# Patient Record
Sex: MALE | Race: OTHER | URBAN - METROPOLITAN AREA
[De-identification: names, ages, dates, MRNs, and addresses within clinical notes are randomized per-mention and may not be internally consistent; named-entity substitution may affect disease eponyms.]

---

## 2021-04-09 ENCOUNTER — OFFICE VISIT (OUTPATIENT)
Dept: ORTHOPEDIC SURGERY | Age: 51
End: 2021-04-09
Payer: COMMERCIAL

## 2021-04-09 VITALS — HEIGHT: 68 IN | RESPIRATION RATE: 16 BRPM | WEIGHT: 180 LBS | BODY MASS INDEX: 27.28 KG/M2

## 2021-04-09 DIAGNOSIS — S42.254A NONDISPLACED FRACTURE OF GREATER TUBEROSITY OF RIGHT HUMERUS, INITIAL ENCOUNTER FOR CLOSED FRACTURE: Primary | ICD-10-CM

## 2021-04-09 PROCEDURE — 99203 OFFICE O/P NEW LOW 30 MIN: CPT | Performed by: ORTHOPAEDIC SURGERY

## 2021-04-09 PROCEDURE — L3660 SO 8 AB RSTR CAN/WEB PRE OTS: HCPCS | Performed by: ORTHOPAEDIC SURGERY

## 2021-04-09 RX ORDER — PREDNISONE 20 MG/1
TABLET ORAL
COMMUNITY
Start: 2021-04-03

## 2021-04-09 RX ORDER — CYCLOBENZAPRINE HCL 5 MG
TABLET ORAL
COMMUNITY
Start: 2021-04-03

## 2021-04-09 SDOH — HEALTH STABILITY: MENTAL HEALTH: HOW OFTEN DO YOU HAVE A DRINK CONTAINING ALCOHOL?: NOT ASKED

## 2021-04-09 NOTE — PROGRESS NOTES
ORTHOPAEDIC CONSULTATION NOTE    Chief Complaint   Patient presents with    Shoulder Pain     shoulder       HPI   4/9/2021  49 y.o. male RHD seen in consultation at the request of Haylie GOMEZ for evaluation of right shoulder pain:    Onset 4/2/2021  Injury/trauma - playing soccer, and fell onto the right shoulder   Felt a crack  History of symptoms - denies  Pain is located right lateral shoulder  Worse with attempted shoulder elevation  Better with rest  Associated with swelling and bruising, weakness    Does not smoke      Review of Systems - reviewed and none listed  Constitutional  denies fevers, weight loss  Cardiovascular  denies chest pain, palpitations, peripheral edema, blood clots  Respiratory  denies SOB, cough  Gastrointestinal  denies abdominal pain, nausea, vomiting  Genitourinary  denies dysuria, discharge  Musculoskeletal  per HPI  Integumentary  denies rash, sores  Neurologic  denies numbness, tingling, paresthesias  Hematologic  denies abnormal bleeding, blood clots  Allergic/Immunologic  denies metal allergies, recurrent infections    No Known Allergies     Current Outpatient Medications   Medication Sig Dispense Refill    cyclobenzaprine (FLEXERIL) 5 MG tablet TAKE 1 TABLET BY MOUTH THREE TIMES DAILY AS NEEDED FOR MUSCLE PAIN OR TIGHTNESS      predniSONE (DELTASONE) 20 MG tablet TAKE 1 TABLET BY MOUTH TWICE DAILY FOR 5 DAYS THEN TAKE 1 TABLET BY MOUTH DAILY FOR 2 DAYS       No current facility-administered medications for this visit. No past medical history on file. No past surgical history on file. No family history on file.       Social History     Socioeconomic History    Marital status: Single     Spouse name: Not on file    Number of children: Not on file    Years of education: Not on file    Highest education level: Not on file   Occupational History    Not on file   Social Needs    Financial resource strain: Not on file    Food insecurity     Worry: Not on file     Inability: Not on file    Transportation needs     Medical: Not on file     Non-medical: Not on file   Tobacco Use    Smoking status: Never Smoker    Smokeless tobacco: Never Used   Substance and Sexual Activity    Alcohol use: Yes    Drug use: Never    Sexual activity: Not on file   Lifestyle    Physical activity     Days per week: Not on file     Minutes per session: Not on file    Stress: Not on file   Relationships    Social connections     Talks on phone: Not on file     Gets together: Not on file     Attends Oriental orthodox service: Not on file     Active member of club or organization: Not on file     Attends meetings of clubs or organizations: Not on file     Relationship status: Not on file    Intimate partner violence     Fear of current or ex partner: Not on file     Emotionally abused: Not on file     Physically abused: Not on file     Forced sexual activity: Not on file   Other Topics Concern    Not on file   Social History Narrative    Not on file        Vitals:    04/09/21 0818   Resp: 16   Weight: 180 lb (81.6 kg)   Height: 5' 8\" (1.727 m)       Physical Exam  Constitutional  well-groomed, well-nourished, Body mass index is 27.37 kg/m². Psychiatric  pleasant, normal mood & affect  Cardiovascular  RRR, right radial pulse intact  Respiratory  respirations unlabored, on room air  Skin  no rashes, wounds, or lesions seen on exposed skin  Neck/Spine - full ROM, no TTP of spinous processes, no TTP of paraspinal musculature,  negative Spurling's  Neurological - SILT M/U/R/A nerve distributions; AIN/PIN/IO intact  Right shoulder - TTP proximal humerus   No TTP scapula, clavicle   No TTP distally   Shoulder ROM not formally tested   Swelling and ecchymosis right anterior shoulder/arm      Imaging:  Images were personally reviewed by myself and discussed with the patient  Right shoulder 4 views performed today in clinic   -  Nondisplaced fracture of the greater tuberosity is seen. No definite extension into the surgical neck is visualized. Glenohumeral articulation is within normal limits, there are no loose bodies appreciated. Rafat's line is preserved. On axillary view, the humeral head is well-centered within the glenoid. The acromioclavicular joint demonstrates no significant degenerative changes. Assessment & Plan:  49 y.o. male who presents with    Diagnosis Orders   1. Nondisplaced fracture of greater tuberosity of right humerus, initial encounter for closed fracture  XR SHOULDER RIGHT (MIN 2 VIEWS)    Breg DLX Shoulder Immobilizer    MRI SHOULDER RIGHT WO CONTRAST           Procedures    Breg DLX Shoulder Immobilizer     Patient was prescribed a DLX Shoulder Immobilizer. The right shoulder will require stabilization / immobilization from this orthosis. The orthosis will assist in protecting the affected area, provide functional support and facilitate healing. The patient was educated and fit by a healthcare professional with expert knowledge and specialization in brace application while under the direct supervision of the treating physician. Verbal and written instructions for the use of and application of this item were provided. They were instructed to contact the office immediately should the brace result in increased pain, decreased sensation, increased swelling or worsening of the condition. Reviewed injury/fracture and imaging with patient  Recommended conservative treatment    Sling, RUE NWB  Ice, OTC tylenol PRN  No shoulder ROM    BID loosen sling and perform elbow, forearm, wrist, finger ROM exercises    Obtain advanced imaging to evaluate for concomitant pathology and confirm fracture    I will order an MRI to evaluate. The patient is advised to return once completed so that we may review the images together and to discuss treatment options.       Dc Gonsales

## 2021-04-20 ENCOUNTER — OFFICE VISIT (OUTPATIENT)
Dept: ORTHOPEDIC SURGERY | Age: 51
End: 2021-04-20
Payer: COMMERCIAL

## 2021-04-20 DIAGNOSIS — S42.254D NONDISPLACED FRACTURE OF GREATER TUBEROSITY OF RIGHT HUMERUS, SUBSEQUENT ENCOUNTER FOR FRACTURE WITH ROUTINE HEALING: Primary | ICD-10-CM

## 2021-04-20 PROCEDURE — 99213 OFFICE O/P EST LOW 20 MIN: CPT | Performed by: ORTHOPAEDIC SURGERY

## 2021-04-20 NOTE — PROGRESS NOTES
ORTHOPAEDIC CONSULTATION NOTE    Chief Complaint   Patient presents with    Results     mri results        HPI   4/20/2021  FU right shoulder  Pain rated 6/10 today  He reports it is feeling better  Bruising improved  He reports he is using the sling, but he did not bring to office today  Denies N/T      4/9/2021  49 y.o. male RHD seen in consultation at the request of Shannan GOMEZ for evaluation of right shoulder pain:    Onset 4/2/2021  Injury/trauma - playing soccer, and fell onto the right shoulder   Felt a crack  History of symptoms - denies  Pain is located right lateral shoulder  Worse with attempted shoulder elevation  Better with rest  Associated with swelling and bruising, weakness    Does not smoke        No Known Allergies     Current Outpatient Medications   Medication Sig Dispense Refill    cyclobenzaprine (FLEXERIL) 5 MG tablet TAKE 1 TABLET BY MOUTH THREE TIMES DAILY AS NEEDED FOR MUSCLE PAIN OR TIGHTNESS      predniSONE (DELTASONE) 20 MG tablet TAKE 1 TABLET BY MOUTH TWICE DAILY FOR 5 DAYS THEN TAKE 1 TABLET BY MOUTH DAILY FOR 2 DAYS       No current facility-administered medications for this visit. No past medical history on file. No past surgical history on file. No family history on file. Social History     Socioeconomic History    Marital status: Single     Spouse name: Not on file    Number of children: Not on file    Years of education: Not on file    Highest education level: Not on file   Occupational History    Not on file   Social Needs    Financial resource strain: Not on file    Food insecurity     Worry: Not on file     Inability: Not on file    Transportation needs     Medical: Not on file     Non-medical: Not on file   Tobacco Use    Smoking status: Never Smoker    Smokeless tobacco: Never Used   Substance and Sexual Activity    Alcohol use:  Yes    Drug use: Never    Sexual activity: Not on file   Lifestyle    Physical activity     Days per week: Not on file     Minutes per session: Not on file    Stress: Not on file   Relationships    Social connections     Talks on phone: Not on file     Gets together: Not on file     Attends Zoroastrian service: Not on file     Active member of club or organization: Not on file     Attends meetings of clubs or organizations: Not on file     Relationship status: Not on file    Intimate partner violence     Fear of current or ex partner: Not on file     Emotionally abused: Not on file     Physically abused: Not on file     Forced sexual activity: Not on file   Other Topics Concern    Not on file   Social History Narrative    Not on file        There were no vitals filed for this visit. Physical Exam  There is no height or weight on file to calculate BMI. Neurological - RUE SILT M/U/R/A nerve distributions; AIN/PIN/IO intact  Right shoulder - TTP proximal humerus   No TTP scapula, clavicle   No TTP distally   PROM    FE/scaption 120, did not test above that   Swelling and ecchymosis right anterior shoulder/arm present, but improved compared to last visit      Imaging:  Images were personally reviewed by myself and discussed with the patient   Narrative   Site: [facility] #: [id number] #: [id number] Procedure: MR Right Shoulder joint w/o Contrast ; Reason for Exam: nondisplaced fracture of greater tuberosity of right humerus initial encounter for closed fracture   This document is confidential medical information.  Unauthorized disclosure or use of this information is prohibited by law. If you are not the intended recipient of this document, please advise us by calling immediately [phone].       [facility], [address] [place] [address] Road           Patient Name: Juan Jose Lynne   Case ID: [id number]   Patient : 1970   Referring Physician: Julianne Morgan MD   Exam Date: 2021   Exam Description: MR Right Shoulder joint w/o Contrast            HISTORY:  49year-old male with right shoulder pain and limited range of motion after injury on    04/02/2021; patient was playing soccer and fell with the arm reached out.  No history of    surgery.  Evaluate for greater tuberosity fracture.       TECHNICAL FACTORS:  Long- and short-axis fat- and water-weighted images were performed.       COMPARISON:  None.       FINDINGS:  ROTATOR CUFF:   Supraspinatus/infraspinatus: Swelling and contusion of the supraspinatus and infraspinatus    tendons, secondary to adjacent greater tuberosity fracture.  No full-thickness or retracted    tear.       Subscapularis: Intact.       Teres Minor: Intact.       Biceps Tendon: Intact.       ACROMIOCLAVICULAR JOINT:   Articular Surfaces: No bony hypertrophy or degenerative changes of the AC joint.  No evidence of    AC joint separation or AC ligament injury.       Coracoclavicular Ligaments: Intact.       Subacromial/Subdeltoid Bursa: Unremarkable.       Subacromial Arch/Outlet: Type 2 acromion, with mild posterior downsloping, contributing to    lateral outlet stenosis, without evidence of active impingement.       GLENOHUMERAL JOINT:   Articular Surfaces: No high-grade chondromalacia of the glenohumeral articular surfaces.       Glenoid Labrum: Small, chronic SLAP 1-2 tear of the superior glenoid labrum, without displaced    fragment or paralabral pseudocyst formation.           GENERAL:   Bones: Comminuted fracture of the greater tuberosity, with slight cortical depression and    fragmentation at the anterolateral margin, but no grossly displaced fracture fragment.  No    evidence of glenohumeral dislocation injury.  The humeral head is centered within the glenoid.       Muscles: Mild swelling of the deep anterior deltoid muscles, overlying the lateral margin of    the greater tuberosity.  The remaining shoulder musculature is intact.       Effusion: None.       Intra-Articular Bodies: None.       Soft Tissue: Unremarkable.       Axilla/Visualized Lung Madden: Unremarkable.       CONCLUSION:   1. Comminuted fracture of the greater tuberosity, with slight cortical depression and    fragmentation at the anterolateral margin, but no grossly displaced fracture fragment. 2. Adjacent swelling and contusion of the distal supraspinatus and infraspinatus tendon; no    full-thickness or retracted rotator cuff tear. 3. Mild swelling of the deep anterior deltoid muscles, overlying the lateral margin of the    greater tuberosity. 4. Small, chronic nondisplaced SLAP 1-2 tear of the superior glenoid labrum.       Thank you for the opportunity to provide your interpretation.       Abby Castellanos. Glen Otero MD       A: FRANNY/SARAH 04/14/2021 10:49 AM         Right shoulder 2 views repeated today in clinic for serial followup imaging -  Nondisplaced fracture of the greater tuberosity again seen. No extension into the surgical neck is visualized. Glenohumeral articulation is within normal limits, there are no loose bodies appreciated. Rafat's line is preserved. On axillary view, the humeral head is well-centered within the glenoid. Assessment & Plan:  49 y.o. male who presents with    Diagnosis Orders   1. Nondisplaced fracture of greater tuberosity of right humerus, subsequent encounter for fracture with routine healing  XR SHOULDER RIGHT (MIN 2 VIEWS)       No orders of the defined types were placed in this encounter. Reviewed injury/fracture and imaging (including MRI) with patient  Recommend continued nonoperative treatment    Continue sling  RUE NWB    BID home exercise program (10 reps each time):   1) passive ER with stick    2) passive FE using pulley or opposite hand to just above shoulder level   3) continue with elbow, forearm, wrist, finger ROM     HEP printed for patient  Continue with ice and tylenol. Okay to resume NSAIDs as well as needed.     FU in 1 month with repeat XRs, will likely progress activities then    Anders Napoles

## 2021-06-02 ENCOUNTER — OFFICE VISIT (OUTPATIENT)
Dept: ORTHOPEDIC SURGERY | Age: 51
End: 2021-06-02
Payer: COMMERCIAL

## 2021-06-02 VITALS — WEIGHT: 180 LBS | TEMPERATURE: 98.2 F | HEIGHT: 68 IN | BODY MASS INDEX: 27.28 KG/M2

## 2021-06-02 DIAGNOSIS — S42.254D NONDISPLACED FRACTURE OF GREATER TUBEROSITY OF RIGHT HUMERUS, SUBSEQUENT ENCOUNTER FOR FRACTURE WITH ROUTINE HEALING: Primary | ICD-10-CM

## 2021-06-02 PROCEDURE — 99213 OFFICE O/P EST LOW 20 MIN: CPT | Performed by: ORTHOPAEDIC SURGERY

## 2021-06-02 NOTE — PROGRESS NOTES
document, please advise us by calling immediately [phone].       [facility], [address] [place] [address]           Patient Name: Caitlin Chin   Case ID: [id number]   Patient : 1970   Referring Physician: Deanna Ordoñez MD   Exam Date: 2021   Exam Description: MR Right Shoulder joint w/o Contrast            HISTORY:  49year-old male with right shoulder pain and limited range of motion after injury on    2021; patient was playing soccer and fell with the arm reached out.  No history of    surgery.  Evaluate for greater tuberosity fracture.       TECHNICAL FACTORS:  Long- and short-axis fat- and water-weighted images were performed.       COMPARISON:  None.       FINDINGS:  ROTATOR CUFF:   Supraspinatus/infraspinatus: Swelling and contusion of the supraspinatus and infraspinatus    tendons, secondary to adjacent greater tuberosity fracture.  No full-thickness or retracted    tear.       Subscapularis: Intact.       Teres Minor: Intact.       Biceps Tendon: Intact.       ACROMIOCLAVICULAR JOINT:   Articular Surfaces: No bony hypertrophy or degenerative changes of the AC joint.  No evidence of    AC joint separation or AC ligament injury.       Coracoclavicular Ligaments: Intact.       Subacromial/Subdeltoid Bursa: Unremarkable.       Subacromial Arch/Outlet: Type 2 acromion, with mild posterior downsloping, contributing to    lateral outlet stenosis, without evidence of active impingement.       GLENOHUMERAL JOINT:   Articular Surfaces: No high-grade chondromalacia of the glenohumeral articular surfaces.       Glenoid Labrum: Small, chronic SLAP 1-2 tear of the superior glenoid labrum, without displaced    fragment or paralabral pseudocyst formation.           GENERAL:   Bones: Comminuted fracture of the greater tuberosity, with slight cortical depression and    fragmentation at the anterolateral margin, but no grossly displaced fracture fragment.  No    evidence of glenohumeral dislocation injury.  The humeral head is centered within the glenoid.       Muscles: Mild swelling of the deep anterior deltoid muscles, overlying the lateral margin of    the greater tuberosity.  The remaining shoulder musculature is intact.       Effusion: None.       Intra-Articular Bodies: None.       Soft Tissue: Unremarkable.       Axilla/Visualized Lung Fields: Unremarkable.       CONCLUSION:   1. Comminuted fracture of the greater tuberosity, with slight cortical depression and    fragmentation at the anterolateral margin, but no grossly displaced fracture fragment. 2. Adjacent swelling and contusion of the distal supraspinatus and infraspinatus tendon; no    full-thickness or retracted rotator cuff tear. 3. Mild swelling of the deep anterior deltoid muscles, overlying the lateral margin of the    greater tuberosity. 4. Small, chronic nondisplaced SLAP 1-2 tear of the superior glenoid labrum.       Thank you for the opportunity to provide your interpretation.       Mildred Pleitez. Marc Escamilla MD       A: FRANNY/SARAH 04/14/2021 10:49 AM         Right shoulder 4 views repeated today in clinic for serial followup imaging - healing impacted fracture of the greater tuberosity again seen. No extension into the surgical neck is visualized. No interval displacement. Glenohumeral articulation is within normal limits, there are no loose bodies appreciated. Rafat's line is preserved. On axillary view, the humeral head is well-centered within the glenoid. Assessment & Plan:  49 y.o. male who presents with    Diagnosis Orders   1. Nondisplaced fracture of greater tuberosity of right humerus, subsequent encounter for fracture with routine healing  XR SHOULDER RIGHT (MIN 2 VIEWS)       No orders of the defined types were placed in this encounter.     He is doing well  He has good ROM and strength on exam  He declined formal PT for exercises    Advance activities as tolerated gradually  OTC tylenol/NSAID PRN  Back off if pain returns    FU with any issues    Maciej Gross MD

## 2023-10-10 ENCOUNTER — HOSPITAL ENCOUNTER (OUTPATIENT)
Dept: ULTRASOUND IMAGING | Age: 53
Discharge: HOME | End: 2023-10-10
Payer: COMMERCIAL

## 2023-10-10 DIAGNOSIS — N28.9 DISORDER OF KIDNEY AND URETER: ICD-10-CM

## 2023-10-10 PROCEDURE — 76775 US EXAM ABDO BACK WALL LIM: CPT

## 2024-04-29 ENCOUNTER — HOSPITAL ENCOUNTER (EMERGENCY)
Age: 54
Discharge: HOME | End: 2024-04-29
Payer: COMMERCIAL

## 2024-04-29 ENCOUNTER — APPOINTMENT (OUTPATIENT)
Dept: GENERAL RADIOLOGY | Age: 54
End: 2024-04-29
Payer: COMMERCIAL

## 2024-04-29 VITALS
BODY MASS INDEX: 27.74 KG/M2 | WEIGHT: 183 LBS | HEIGHT: 68 IN | SYSTOLIC BLOOD PRESSURE: 179 MMHG | RESPIRATION RATE: 16 BRPM | TEMPERATURE: 97.6 F | DIASTOLIC BLOOD PRESSURE: 110 MMHG | HEART RATE: 86 BPM | OXYGEN SATURATION: 98 %

## 2024-04-29 DIAGNOSIS — I10 ESSENTIAL HYPERTENSION: ICD-10-CM

## 2024-04-29 DIAGNOSIS — S60.031A CONTUSION OF RIGHT MIDDLE FINGER WITHOUT DAMAGE TO NAIL, INITIAL ENCOUNTER: ICD-10-CM

## 2024-04-29 DIAGNOSIS — S60.10XA SUBUNGUAL HEMATOMA OF DIGIT OF HAND, INITIAL ENCOUNTER: Primary | ICD-10-CM

## 2024-04-29 PROCEDURE — 6370000000 HC RX 637 (ALT 250 FOR IP): Performed by: PHYSICIAN ASSISTANT

## 2024-04-29 PROCEDURE — 99283 EMERGENCY DEPT VISIT LOW MDM: CPT

## 2024-04-29 PROCEDURE — 73140 X-RAY EXAM OF FINGER(S): CPT

## 2024-04-29 RX ORDER — IBUPROFEN 600 MG/1
600 TABLET ORAL ONCE
Status: COMPLETED | OUTPATIENT
Start: 2024-04-29 | End: 2024-04-29

## 2024-04-29 RX ORDER — IBUPROFEN 800 MG/1
800 TABLET ORAL EVERY 8 HOURS PRN
Qty: 20 TABLET | Refills: 0 | Status: SHIPPED | OUTPATIENT
Start: 2024-04-29

## 2024-04-29 RX ADMIN — IBUPROFEN 600 MG: 600 TABLET, FILM COATED ORAL at 10:14

## 2024-04-29 ASSESSMENT — PAIN - FUNCTIONAL ASSESSMENT: PAIN_FUNCTIONAL_ASSESSMENT: 0-10

## 2024-04-29 ASSESSMENT — ENCOUNTER SYMPTOMS
COUGH: 0
SHORTNESS OF BREATH: 0
COLOR CHANGE: 0
WHEEZING: 0
STRIDOR: 0

## 2024-04-29 ASSESSMENT — PAIN DESCRIPTION - ONSET: ONSET: SUDDEN

## 2024-04-29 ASSESSMENT — PAIN DESCRIPTION - LOCATION: LOCATION: FINGER (COMMENT WHICH ONE)

## 2024-04-29 ASSESSMENT — PAIN DESCRIPTION - ORIENTATION: ORIENTATION: RIGHT

## 2024-04-29 ASSESSMENT — LIFESTYLE VARIABLES
HOW MANY STANDARD DRINKS CONTAINING ALCOHOL DO YOU HAVE ON A TYPICAL DAY: 1 OR 2
HOW OFTEN DO YOU HAVE A DRINK CONTAINING ALCOHOL: MONTHLY OR LESS

## 2024-04-29 ASSESSMENT — PAIN DESCRIPTION - FREQUENCY: FREQUENCY: CONTINUOUS

## 2024-04-29 ASSESSMENT — PAIN DESCRIPTION - PAIN TYPE: TYPE: ACUTE PAIN

## 2024-04-29 ASSESSMENT — PAIN SCALES - GENERAL: PAINLEVEL_OUTOF10: 10

## 2024-04-29 NOTE — ED PROVIDER NOTES
I am the Primary Clinician of Record.    FINAL IMPRESSION      1. Subungual hematoma of digit of hand, initial encounter    2. Contusion of right middle finger without damage to nail, initial encounter    3. Essential hypertension          DISPOSITION/PLAN     DISPOSITION Decision To Discharge 04/29/2024 10:33:51 AM      PATIENT REFERRED TO:  Ryan Ramirez MD  [address]  [facility] [address]  [phone]          [facility] Emergency Department  [address] [address]  [phone]    If symptoms worsen    Oswald Egan MD  [address]  [address]  [facility] [address]  [phone]            DISCHARGE MEDICATIONS:  Discharge Medication List as of 4/29/2024 11:03 AM        START taking these medications    Details   ibuprofen (IBU) 800 MG tablet Take 1 tablet by mouth every 8 hours as needed for Pain, Disp-20 tablet, R-0Normal             DISCONTINUED MEDICATIONS:  Discharge Medication List as of 4/29/2024 11:03 AM                 (Please note that portions of this note were completed with a voice recognition program.  Efforts were made to edit the dictations but occasionally words are mis-transcribed.)    Marco Antonio Melara PA-C (electronically signed)            Marco Antonio Melara PA-C  04/29/24 8883

## 2024-04-30 ENCOUNTER — TELEPHONE (OUTPATIENT)
Dept: ORTHOPEDIC SURGERY | Age: 54
End: 2024-04-30

## 2024-04-30 NOTE — TELEPHONE ENCOUNTER
Did leave message regarding ED referral for an appointment. Upon return call please schedule with Dr. Egan.